# Patient Record
Sex: MALE | Race: WHITE | NOT HISPANIC OR LATINO | Employment: OTHER | URBAN - METROPOLITAN AREA
[De-identification: names, ages, dates, MRNs, and addresses within clinical notes are randomized per-mention and may not be internally consistent; named-entity substitution may affect disease eponyms.]

---

## 2021-02-17 ENCOUNTER — TREATMENT (OUTPATIENT)
Dept: PHYSICAL THERAPY | Facility: CLINIC | Age: 61
End: 2021-02-17

## 2021-02-17 DIAGNOSIS — S76.112D RUPTURE OF QUADRICEPS TENDON, LEFT, SUBSEQUENT ENCOUNTER: Primary | ICD-10-CM

## 2021-02-17 DIAGNOSIS — R26.2 WALKING DIFFICULTY DUE TO KNEE JOINT DISORDER: ICD-10-CM

## 2021-02-17 DIAGNOSIS — M25.562 ACUTE PAIN OF LEFT KNEE: ICD-10-CM

## 2021-02-17 DIAGNOSIS — M25.9 WALKING DIFFICULTY DUE TO KNEE JOINT DISORDER: ICD-10-CM

## 2021-02-17 PROCEDURE — 97110 THERAPEUTIC EXERCISES: CPT | Performed by: PHYSICAL THERAPIST

## 2021-02-17 PROCEDURE — 97530 THERAPEUTIC ACTIVITIES: CPT | Performed by: PHYSICAL THERAPIST

## 2021-02-17 PROCEDURE — 97162 PT EVAL MOD COMPLEX 30 MIN: CPT | Performed by: PHYSICAL THERAPIST

## 2021-02-17 NOTE — PROGRESS NOTES
Physical Therapy Initial Evaluation and Plan of Care    Patient: Abrahan Quinn   : 1960  Diagnosis/ICD-10 Code:  No primary diagnosis found.  Referring practitioner: Cristobal Jones MD  PMx Reviewed : 2021    Subjective Evaluation    History of Present Illness  Date of onset: 2021  Date of surgery: 2/10/2021  Mechanism of injury: Pt slipped on black ice in a parking lot, landing on a flexed (L) knee.  Unable to extend the (L) knee.  Went to the MD the next day.      Had surgery on 2/10/2021, by Cristobal Jones MD.   (L) Quad rupture with repair.      Pt presents wearing a locked brace, using crutches.  Reports using a RWX at home.      Pt was pleasant and accompanied by his wife to visit, but pt presented w/ no cognitive deficit.       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          Quality of life: excellent    Pain  Current pain ratin  At best pain ratin  At worst pain ratin  Location: (L) Knee  Quality: burning, dull ache and discomfort  Relieving factors: ice and medications (Advil, )  Aggravating factors: sleeping (transfers, WB >10 mins)    Social Support  Lives in: multiple-level home  Lives with: spouse (Dog & Cat)       LEFS Score: 12/80 (15% perceived disability)       Objective          Observations   Left Knee   Positive for edema and incision (Pt changed bandage prior to visit but showed pic of incision - No sign of drainage or infection, with staples still intact).     Additional Knee Observation Details  Pt had water proof bandage over incision given to him by the MD office.     Pt wearing a post-op locking knee brace on (L) LE.    Passive Range of Motion     Additional Passive Range of Motion Details  Following Protocol Phase 1 - with brace blocked at 30 degrees flexion, pt was able to perform heel slides to 30 degrees flexion      Strength/Myotome Testing     Left Knee   Quadriceps  contraction: poor    Right Knee   Quadriceps contraction: good    Ambulation   Weight-Bearing Status   Weight-Bearing Status (Left): toe-touch weight-bearing   Assistive device used: crutches and front-wheeled walker    Additional Weight-Bearing Status Details  RWX at home          Assessment & Plan     Assessment  Impairments: abnormal gait, abnormal muscle firing, abnormal or restricted ROM, activity intolerance, impaired balance, impaired physical strength, lacks appropriate home exercise program, pain with function, safety issue and weight-bearing intolerance  Assessment details: Pt presents to PT with symptoms consistent with (L) Quad Rupture repair.  Pt would benefit from skilled PT intervention to address the deficits noted.  Objective evaluation was limited secondary to safety of the repair.    Reviewed with the pt about the pathology, common surgical repair and POC w/ PT for recovery using the protocol given by Dr. Cristobal Jones.  Prognosis: good  Prognosis details:       Functional Limitations: carrying objects, walking, uncomfortable because of pain, moving in bed, sitting, standing, stooping and unable to perform repetitive tasks  Goals  Plan Goals: SHORT TERM GOALS: Time for Goal Achievement: 15 visits    1.  Patient to be compliant with HEP.   2.  Pt able to ascend/descend steps and transfer with less knee pain < 5/10  3.  Increased hip joint mobility to allow for decreased stress at tibiofemoral joint  4.  Pt. to exhibit increased LE endurance/strength to 3+/5 to allow for increased ease with sit-stand and standing/walking > 30 minutes, such as walking in grocery stores w/ or w/o assistive device.    LONG TERM GOALS: Time for Goal Achievement: 30 visits  1.  Pt to score < 30% perceived disability on LEFS  2.  Patient able to ascend/descend steps and prolonged standing & cooking with pain < 2/10  3.  Pt to exhibit full knee AROM to allow for kneeling, bending squatting as is necessary for ADL's, IADL's  and household activities.   4.  Pt to exhibit LE endurance/strength to 4+/5 to allow for walking, steps and transfers to occur safely for > 30 mins.  5.  Pt to demonstrate increased stability of the knee to balance on foam as needed to traverse uneven terrain to allow for walking in neighborhood.          Plan  Therapy options: will be seen for skilled physical therapy services  Planned modality interventions: ultrasound, electrical stimulation/Russian stimulation, thermotherapy (hydrocollator packs), cryotherapy, TENS and dry needling  Other planned modality interventions: Dry Needling  Planned therapy interventions: balance/weight-bearing training, body mechanics training, functional ROM exercises, flexibility, home exercise program, joint mobilization, stretching, strengthening, soft tissue mobilization, neuromuscular re-education, manual therapy, therapeutic activities, transfer training, gait training, dressing changes, compression, abdominal trunk stabilization and motor coordination training  Frequency: 1-2x week for 30 visits.  Treatment plan discussed with: patient and family        Manual Therapy:          mins  44267;  Therapeutic Exercise:     10     mins  77718;     Neuromuscular Luis Felipe:         mins  20650;    Therapeutic Activity:      15     mins  42291;     Gait Training:            mins  96463;     Ultrasound:           mins  35666;    Electrical Stimulation:          mins  80309 ( );  Dry Needling           mins self-pay  Traction           mins 14133  Canalith Repositioning         mins 07018      Timed Treatment:   25   mins   Total Treatment:     65   mins    PT SIGNATURE: Ruben Porter PT   [place] Lic #[id number]    DATE TREATMENT INITIATED: 2/17/2021    Initial Certification   Certification Period: 5/18/2021  I certify that the therapy services are furnished while this patient is under my care.  The services outlined above are required by this patient, and will be reviewed every 90  days.     PHYSICIAN: Cristobal Jones MD      DATE:     Please sign and return via fax to [phone]. Thank you, [facility].

## 2021-02-23 ENCOUNTER — TREATMENT (OUTPATIENT)
Dept: PHYSICAL THERAPY | Facility: CLINIC | Age: 61
End: 2021-02-23

## 2021-02-23 DIAGNOSIS — R26.2 WALKING DIFFICULTY DUE TO KNEE JOINT DISORDER: ICD-10-CM

## 2021-02-23 DIAGNOSIS — M25.9 WALKING DIFFICULTY DUE TO KNEE JOINT DISORDER: ICD-10-CM

## 2021-02-23 DIAGNOSIS — S76.112D RUPTURE OF QUADRICEPS TENDON, LEFT, SUBSEQUENT ENCOUNTER: Primary | ICD-10-CM

## 2021-02-23 DIAGNOSIS — M25.562 ACUTE PAIN OF LEFT KNEE: ICD-10-CM

## 2021-02-23 PROCEDURE — 97110 THERAPEUTIC EXERCISES: CPT | Performed by: PHYSICAL THERAPIST

## 2021-02-23 PROCEDURE — 97140 MANUAL THERAPY 1/> REGIONS: CPT | Performed by: PHYSICAL THERAPIST

## 2021-02-24 NOTE — PROGRESS NOTES
Physical Therapy Daily Progress Note  Visit # 2           Patient: Abrahan Quinn   : 1960  Diagnosis/ICD-10 Code:  Rupture of quadriceps tendon, left, subsequent encounter [S76.112D]  Referring practitioner: Cristobal Jones MD  Date of Initial Evaluation:  Type: THERAPY  Noted: 2021      Subjective  Abrahan Quinn reports:   Doing well overall, no specific areas of pain noted at onset of today's visit.  Feels like he is able to walk normally for short distances without Cx.         Objective   See Exercise, Manual, and Modality Logs for complete treatment.     Reviewed current HEP, progressed therex program with exercises as noted.  Added supine hip abd, patellar mobs, clamshell to HEP, written instructions issued ([employer] code [id number]).      Assessment & Plan     Assessment  Assessment details:   Tolerated continued manual therapy and progression of therapeutic exercise well today, no increased pain reported during or after exercises.    Reviewed MD protocol w/ pt, discussed keeping brace locked during ambulation and while sleeping, unlocking to 50 degrees (45 degrees per protocol) with sitting and with exercise at home.  Reviewed TTWB with pt and advised to continue with TTWB, not to push WB status.           Progress per Plan of Care and Progress strengthening /stabilization /functional activity           Timed:         Manual Therapy:     9    mins  80821     Therapeutic Exercise:     32    mins  27240     Neuromuscular Luis Felipe:        mins  60997    Therapeutic Activity:          mins  59505     Gait Training:           mins  46360     Ultrasound:          mins  56444    Ionto                                   mins  76131  Self Care                            mins  56119    Un-Timed:  Electrical Stimulation:         mins 12693 ( )  Traction          mins 40559    Timed Treatment:   41   mins   Total Treatment:     45   mins    DANITA Garrett License #[id number]  Physical Therapist  Assistant

## 2021-02-26 ENCOUNTER — TREATMENT (OUTPATIENT)
Dept: PHYSICAL THERAPY | Facility: CLINIC | Age: 61
End: 2021-02-26

## 2021-02-26 DIAGNOSIS — R26.2 WALKING DIFFICULTY DUE TO KNEE JOINT DISORDER: ICD-10-CM

## 2021-02-26 DIAGNOSIS — S76.112D RUPTURE OF QUADRICEPS TENDON, LEFT, SUBSEQUENT ENCOUNTER: Primary | ICD-10-CM

## 2021-02-26 DIAGNOSIS — M25.9 WALKING DIFFICULTY DUE TO KNEE JOINT DISORDER: ICD-10-CM

## 2021-02-26 DIAGNOSIS — M25.562 ACUTE PAIN OF LEFT KNEE: ICD-10-CM

## 2021-02-26 PROCEDURE — 97110 THERAPEUTIC EXERCISES: CPT | Performed by: PHYSICAL THERAPIST

## 2021-02-26 PROCEDURE — 97530 THERAPEUTIC ACTIVITIES: CPT | Performed by: PHYSICAL THERAPIST

## 2021-03-02 ENCOUNTER — TREATMENT (OUTPATIENT)
Dept: PHYSICAL THERAPY | Facility: CLINIC | Age: 61
End: 2021-03-02

## 2021-03-02 DIAGNOSIS — M25.9 WALKING DIFFICULTY DUE TO KNEE JOINT DISORDER: ICD-10-CM

## 2021-03-02 DIAGNOSIS — R26.2 WALKING DIFFICULTY DUE TO KNEE JOINT DISORDER: ICD-10-CM

## 2021-03-02 DIAGNOSIS — S76.112D RUPTURE OF QUADRICEPS TENDON, LEFT, SUBSEQUENT ENCOUNTER: Primary | ICD-10-CM

## 2021-03-02 DIAGNOSIS — M25.562 ACUTE PAIN OF LEFT KNEE: ICD-10-CM

## 2021-03-02 PROCEDURE — 97110 THERAPEUTIC EXERCISES: CPT | Performed by: PHYSICAL THERAPIST

## 2021-03-02 PROCEDURE — 97112 NEUROMUSCULAR REEDUCATION: CPT | Performed by: PHYSICAL THERAPIST

## 2021-03-02 NOTE — PROGRESS NOTES
Physical Therapy Daily Progress Note  Visit # 4           Patient: Abrahan Quinn   : 1960  Diagnosis/ICD-10 Code:  Rupture of quadriceps tendon, left, subsequent encounter [S76.112D]  Referring practitioner: Cristobal Jones MD  Date of Initial Evaluation:  Type: THERAPY  Noted: 2021        Subjective  Abrahan Quinn reports:   Doing well overall, no specific areas of pain noted at onset of today's visit.  No c/o or questions with HEP.          Objective   See Exercise, Manual, and Modality Logs for complete treatment.     Reviewed current HEP.  Progressed PT Rx with Russian ES to (L) quads, 10s/30s cycle x10 min, w/quad set during, monitored throughout.          Assessment/Plan  Tolerated continued manual therapy and therapeutic exercise well today, no increased pain reported during or after exercises.  Effective quad contraction achieved with Slovak ES.         Progress per Plan of Care and Progress strengthening /stabilization /functional activity           Timed:         Manual Therapy:     5    mins  82060     Therapeutic Exercise:     30    mins  68279     Neuromuscular Luis Felipe:    10    mins  63977    Therapeutic Activity:          mins  89313     Gait Training:           mins  17428     Ultrasound:          mins  55157    Ionto                                   mins  90833  Self Care                            mins  67037    Un-Timed:  Electrical Stimulation:         mins 06320 (MC )  Traction          mins 21474    Timed Treatment:   45   mins   Total Treatment:     45   mins    DANITA Garrett License #[id number]  Physical Therapist Assistant

## 2021-03-05 ENCOUNTER — TREATMENT (OUTPATIENT)
Dept: PHYSICAL THERAPY | Facility: CLINIC | Age: 61
End: 2021-03-05

## 2021-03-05 DIAGNOSIS — S76.112D RUPTURE OF QUADRICEPS TENDON, LEFT, SUBSEQUENT ENCOUNTER: Primary | ICD-10-CM

## 2021-03-05 DIAGNOSIS — M25.562 ACUTE PAIN OF LEFT KNEE: ICD-10-CM

## 2021-03-05 DIAGNOSIS — R26.2 WALKING DIFFICULTY DUE TO KNEE JOINT DISORDER: ICD-10-CM

## 2021-03-05 DIAGNOSIS — M25.9 WALKING DIFFICULTY DUE TO KNEE JOINT DISORDER: ICD-10-CM

## 2021-03-05 PROCEDURE — 97110 THERAPEUTIC EXERCISES: CPT | Performed by: PHYSICAL THERAPIST

## 2021-03-05 PROCEDURE — 97112 NEUROMUSCULAR REEDUCATION: CPT | Performed by: PHYSICAL THERAPIST

## 2021-03-05 NOTE — PROGRESS NOTES
Physical Therapy Daily Progress Note  Visit # 5           Patient: Abrahan Quinn   : 1960  Diagnosis/ICD-10 Code:  Rupture of quadriceps tendon, left, subsequent encounter [S76.112D]  Referring practitioner: Cristobal Jones MD  Date of Initial Evaluation:  Type: THERAPY  Noted: 2021        Subjective  Abrahan Quinn reports:   Doing well overall, no specific areas of pain noted at onset of today's visit.  No c/o or questions with HEP.          Objective   See Exercise, Manual, and Modality Logs for complete treatment.     Reviewed current HEP.  Continued with NMES to (L) quads, 10s/30s cycle x12 min, w/quad set during, monitored throughout.          Assessment/Plan  Tolerated continued manual therapy and therapeutic exercise well today, no increased pain reported during or after exercises.  Effective quad contraction achieved with NMES.  Discussed acquiring NMES unit for home use and began process with [employer] rep.           Progress per Plan of Care and Progress strengthening /stabilization /functional activity           Timed:         Manual Therapy:     5    mins  57577     Therapeutic Exercise:     30    mins  97962     Neuromuscular Luis Felipe:    10    mins  90799    Therapeutic Activity:          mins  52993     Gait Training:           mins  70550     Ultrasound:          mins  24219    Ionto                                   mins  64020  Self Care                            mins  99045    Un-Timed:  Electrical Stimulation:         mins 91284 ( )  Traction          mins 06896    Timed Treatment:   45   mins   Total Treatment:     50   mins    DANITA Garrett License #[id number]  Physical Therapist Assistant

## 2021-03-09 ENCOUNTER — TREATMENT (OUTPATIENT)
Dept: PHYSICAL THERAPY | Facility: CLINIC | Age: 61
End: 2021-03-09

## 2021-03-09 DIAGNOSIS — M25.562 ACUTE PAIN OF LEFT KNEE: ICD-10-CM

## 2021-03-09 DIAGNOSIS — R26.2 WALKING DIFFICULTY DUE TO KNEE JOINT DISORDER: ICD-10-CM

## 2021-03-09 DIAGNOSIS — M25.9 WALKING DIFFICULTY DUE TO KNEE JOINT DISORDER: ICD-10-CM

## 2021-03-09 DIAGNOSIS — S76.112D RUPTURE OF QUADRICEPS TENDON, LEFT, SUBSEQUENT ENCOUNTER: Primary | ICD-10-CM

## 2021-03-09 PROCEDURE — 97112 NEUROMUSCULAR REEDUCATION: CPT | Performed by: PHYSICAL THERAPIST

## 2021-03-09 PROCEDURE — 97110 THERAPEUTIC EXERCISES: CPT | Performed by: PHYSICAL THERAPIST

## 2021-03-09 NOTE — PROGRESS NOTES
Physical Therapy Daily Progress Note  Visit: 6    Abrahan Cheyenne reports: I am doing good.  I have been doing more on my leg with my brace on.     Subjective     Objective   See Exercise, Manual, and Modality Logs for complete treatment.       Assessment & Plan     Assessment  Assessment details: The pt demos improving maddie for exercise.  THe pt present to PT today with only 1 axillary crutch, but appears to be safe.  Progress per protocol.      Plan  Plan details: Progress ROM / strengthening / stabilization / functional activity as tolerated          Manual Therapy:          mins  32860;  Therapeutic Exercise:     30     mins  77230;     Neuromuscular Luis Felipe:     18    mins  77577;    Therapeutic Activity:           mins  58674;     Gait Training:            mins  84796;     Ultrasound:           mins  46108;    Electrical Stimulation:          mins  79122 ( );  Dry Needling           mins self-pay  Traction           mins 82046  Canalith Repositioning         mins 85988      Timed Treatment:   48   mins   Total Treatment:     48   mins    Ruben Porter PT  [place] License #: [id number]    Physical Therapist

## 2021-03-12 ENCOUNTER — TREATMENT (OUTPATIENT)
Dept: PHYSICAL THERAPY | Facility: CLINIC | Age: 61
End: 2021-03-12

## 2021-03-12 DIAGNOSIS — R26.2 WALKING DIFFICULTY DUE TO KNEE JOINT DISORDER: ICD-10-CM

## 2021-03-12 DIAGNOSIS — M25.562 ACUTE PAIN OF LEFT KNEE: ICD-10-CM

## 2021-03-12 DIAGNOSIS — S76.112D RUPTURE OF QUADRICEPS TENDON, LEFT, SUBSEQUENT ENCOUNTER: Primary | ICD-10-CM

## 2021-03-12 DIAGNOSIS — M25.9 WALKING DIFFICULTY DUE TO KNEE JOINT DISORDER: ICD-10-CM

## 2021-03-12 PROCEDURE — 97110 THERAPEUTIC EXERCISES: CPT | Performed by: PHYSICAL THERAPIST

## 2021-03-12 PROCEDURE — 97112 NEUROMUSCULAR REEDUCATION: CPT | Performed by: PHYSICAL THERAPIST

## 2021-03-12 NOTE — PROGRESS NOTES
Physical Therapy Daily Progress Note  Visit # 7           Patient: Abrahan Quinn   : 1960  Diagnosis/ICD-10 Code:  Rupture of quadriceps tendon, left, subsequent encounter [S76.112D]  Referring practitioner: Cristobal Jones MD  Date of Initial Evaluation:  Type: THERAPY  Noted: 2021        Subjective    Abrahan Quinn reports:   Doing well overall, no specific areas of pain noted at onset of today's visit.  No c/o or questions with HEP.  Has gotten NMES unit and used Using           Objective     See Exercise, Manual, and Modality Logs for complete treatment.     Initiated mini-squat (0-45 deg), weight shifts per week 4 MD protocol.      Continued with NMES to (L) quads, 10s/30s cycle x12 min, w/quad set during, monitored throughout.        (L) knee AROM flexion (post-exercise): 86 degrees      Assessment & Plan     Assessment  Assessment details: Continuing to progress steadily per MD protocol.  Demos understanding of use of NMES, applied in clinic again today for demo of 4-electrode setup.   Met STG #1, progressing toward remaining STG per MD protocol.     Goals  Plan Goals: SHORT TERM GOALS: Time for Goal Achievement: 15 visits    1.  Patient to be compliant with HEP - MET   2.  Pt able to ascend/descend steps and transfer with less knee pain < 5/10  3.  Increased hip joint mobility to allow for decreased stress at tibiofemoral joint  4.  Pt. to exhibit increased LE endurance/strength to 3+/5 to allow for increased ease with sit-stand and standing/walking > 30 minutes, such as walking in grocery stores w/ or w/o assistive device.            Progress per Plan of Care and Progress strengthening /stabilization /functional activity           Timed:         Manual Therapy:         mins  69746     Therapeutic Exercise:     30    mins  76512     Neuromuscular Luis Felipe:    18    mins  29810 (including monitored used of NMES)    Therapeutic Activity:          mins  27397     Gait Training:           mins   51530     Ultrasound:          mins  90735    Ionto                                   mins  02094  Self Care                            mins  83811    Un-Timed:  Electrical Stimulation:         mins 47557 ( )  Traction          mins 74568    Timed Treatment:   48   mins   Total Treatment:     55   mins    DANITA Garrett License #[id number]  Physical Therapist Assistant

## 2021-03-16 ENCOUNTER — TREATMENT (OUTPATIENT)
Dept: PHYSICAL THERAPY | Facility: CLINIC | Age: 61
End: 2021-03-16

## 2021-03-16 DIAGNOSIS — R26.2 WALKING DIFFICULTY DUE TO KNEE JOINT DISORDER: ICD-10-CM

## 2021-03-16 DIAGNOSIS — S76.112D RUPTURE OF QUADRICEPS TENDON, LEFT, SUBSEQUENT ENCOUNTER: Primary | ICD-10-CM

## 2021-03-16 DIAGNOSIS — M25.562 ACUTE PAIN OF LEFT KNEE: ICD-10-CM

## 2021-03-16 DIAGNOSIS — M25.9 WALKING DIFFICULTY DUE TO KNEE JOINT DISORDER: ICD-10-CM

## 2021-03-16 PROCEDURE — 97110 THERAPEUTIC EXERCISES: CPT | Performed by: PHYSICAL THERAPIST

## 2021-03-16 PROCEDURE — 97530 THERAPEUTIC ACTIVITIES: CPT | Performed by: PHYSICAL THERAPIST

## 2021-03-16 PROCEDURE — 97112 NEUROMUSCULAR REEDUCATION: CPT | Performed by: PHYSICAL THERAPIST

## 2021-03-16 NOTE — PROGRESS NOTES
Physical Therapy Daily Progress Note  Visit: 8    Abrahan FormanCheyenne reports: My (L) knee is doing better.  I am using only the brace to walk, but in public I will still take my crutch.  Been using the NME at home 2x day.      Subjective     Objective   See Exercise, Manual, and Modality Logs for complete treatment.       Assessment & Plan     Assessment  Assessment details: The pt continues to progress well with the (L) knee.  Starting to show improved quad contraction and leg control.  Reviewed safety with the (L) knee, safe to progress but not over do it and risk injury.      Plan  Plan details: Progress ROM / strengthening / stabilization / functional activity as tolerated          Manual Therapy:          mins  48039;  Therapeutic Exercise:     35     mins  70113;     Neuromuscular Luis Felipe:     10    mins  76031;    Therapeutic Activity:      10     mins  25672;     Gait Training:            mins  10488;     Ultrasound:           mins  69387;    Electrical Stimulation:          mins  60209 ( );  Dry Needling           mins self-pay  Traction           mins 14902  Canalith Repositioning         mins 75732      Timed Treatment:   55   mins   Total Treatment:     55   mins    Ruben Porter PT  [place] License #: [id number]    Physical Therapist

## 2021-03-19 ENCOUNTER — TREATMENT (OUTPATIENT)
Dept: PHYSICAL THERAPY | Facility: CLINIC | Age: 61
End: 2021-03-19

## 2021-03-19 DIAGNOSIS — S76.112D RUPTURE OF QUADRICEPS TENDON, LEFT, SUBSEQUENT ENCOUNTER: Primary | ICD-10-CM

## 2021-03-19 DIAGNOSIS — M25.9 WALKING DIFFICULTY DUE TO KNEE JOINT DISORDER: ICD-10-CM

## 2021-03-19 DIAGNOSIS — M25.562 ACUTE PAIN OF LEFT KNEE: ICD-10-CM

## 2021-03-19 DIAGNOSIS — R26.2 WALKING DIFFICULTY DUE TO KNEE JOINT DISORDER: ICD-10-CM

## 2021-03-19 PROCEDURE — 97530 THERAPEUTIC ACTIVITIES: CPT | Performed by: PHYSICAL THERAPIST

## 2021-03-19 PROCEDURE — 97112 NEUROMUSCULAR REEDUCATION: CPT | Performed by: PHYSICAL THERAPIST

## 2021-03-19 PROCEDURE — 97110 THERAPEUTIC EXERCISES: CPT | Performed by: PHYSICAL THERAPIST

## 2021-03-19 NOTE — PROGRESS NOTES
Re-Assessment / Progress Note    Patient: Abrahan Quinn   : 1960  Diagnosis/ICD-10 Code:  Rupture of quadriceps tendon, left, subsequent encounter [S76.112D]  Referring practitioner: Cristobal Jones MD  Date of Initial Visit: Episode Type: THERAPY  Noted: 2021    Today's Date: 3/19/2021  Patient seen for 9 sessions.      Subjective:   Abrahan Quinn reports: My (L) knee is doing well.  I am able to do a SLR now w/o the brace.      Subjective Questionnaire: LEFS:  40% perceived normal ability  ( upon eval)  Clinical Progress: improved  Home Program Compliance: Yes  Treatment has included: therapeutic exercise, neuromuscular re-education, manual therapy, therapeutic activity, electrical stimulation and cryotherapy    Subjective Evaluation    Pain  At worst pain ratin  Location: (L) Knee  Exacerbated by: Too much exercise.         Objective          Active Range of Motion   Left Knee   Flexion: 100 degrees   Extension: 0 degrees       Assessment & Plan     Assessment  Assessment details: Abrahan has progressed well with his (L) knee.  We are following close to the give protocol.  He continues to have limited knee flexion, (L) LE weakness and poor (L) LE endurance.  He has started to walk with the knee brace unlocked safely.  The pt would continue to benefit from skilled care at this time to help restore the pt to their prior level of function.  Thank you for this referral.      Plan  Plan details: Progress ROM / strengthening / stabilization / functional activity as tolerated        Progress toward previous goals: Partially Met    Goals  Plan Goals: SHORT TERM GOALS: Time for Goal Achievement: 15 visits    1.  Patient to be compliant with HEP. - MET  2.  Pt able to ascend/descend steps and transfer with less knee pain < 5/10. - PROGRESSING  3.  Increased hip joint mobility to allow for decreased stress at tibiofemoral joint. - PROGRESSING  4.  Pt. to exhibit increased LE endurance/strength to  3+/5 to allow for increased ease with sit-stand and standing/walking > 30 minutes, such as walking in grocery stores w/ or w/o assistive device. - PROGRESSING    LONG TERM GOALS: Time for Goal Achievement: 30 visits  1.  Pt to score < 30% perceived disability on LEFS  2.  Patient able to ascend/descend steps and prolonged standing & cooking with pain < 2/10  3.  Pt to exhibit full knee AROM to allow for kneeling, bending squatting as is necessary for ADL's, IADL's and household activities.   4.  Pt to exhibit LE endurance/strength to 4+/5 to allow for walking, steps and transfers to occur safely for > 30 mins.  5.  Pt to demonstrate increased stability of the knee to balance on foam as needed to traverse uneven terrain to allow for walking in neighborhood.         Recommendations: Continue as planned  Timeframe: 2 months  Prognosis to achieve goals: good    PT Signature: Ruben Porter, PT  [place] Lic. # [id number]      Based upon review of the patient's progress and continued therapy plan, it is my medical opinion that Abrahan Quinn should continue physical therapy treatment at [facility]  [address] [facility], [facility] [address]  [place] [address] [phone] ; Fax Number [phone]    Signature: __________________________________  Cristobal Jones MD    Manual Therapy:          mins  12549;  Therapeutic Exercise:     35     mins  38221;     Neuromuscular Luis Felipe:     10    mins  91527;    Therapeutic Activity:      15     mins  42432;     Gait Training:            mins  63553;     Ultrasound:           mins  48748;    Electrical Stimulation:          mins  35519 ( );  Dry Needling           mins self-pay  Traction           mins 95254  Canalith Repositioning         mins 33053      Timed Treatment:   60   mins   Total Treatment:     60   mins

## 2021-03-23 ENCOUNTER — TREATMENT (OUTPATIENT)
Dept: PHYSICAL THERAPY | Facility: CLINIC | Age: 61
End: 2021-03-23

## 2021-03-23 DIAGNOSIS — R26.2 WALKING DIFFICULTY DUE TO KNEE JOINT DISORDER: ICD-10-CM

## 2021-03-23 DIAGNOSIS — S76.112D RUPTURE OF QUADRICEPS TENDON, LEFT, SUBSEQUENT ENCOUNTER: Primary | ICD-10-CM

## 2021-03-23 DIAGNOSIS — M25.9 WALKING DIFFICULTY DUE TO KNEE JOINT DISORDER: ICD-10-CM

## 2021-03-23 DIAGNOSIS — M25.562 ACUTE PAIN OF LEFT KNEE: ICD-10-CM

## 2021-03-23 PROCEDURE — 97112 NEUROMUSCULAR REEDUCATION: CPT | Performed by: PHYSICAL THERAPIST

## 2021-03-23 PROCEDURE — 97530 THERAPEUTIC ACTIVITIES: CPT | Performed by: PHYSICAL THERAPIST

## 2021-03-23 PROCEDURE — 97110 THERAPEUTIC EXERCISES: CPT | Performed by: PHYSICAL THERAPIST

## 2021-03-23 NOTE — PROGRESS NOTES
Physical Therapy Daily Progress Note  Visit: 10    Abrahan FormanCheyenne reports: My (L) knee is doing better.  I have been walking with the brace unlocked without issue.      Subjective     Objective   See Exercise, Manual, and Modality Logs for complete treatment.       Assessment & Plan     Assessment  Assessment details: The pt continues to progress safely with the (L) knee.  Reviewed with the pt about not over extending himself w/ activity on his (L) LE.  Able to progress strengthening exercise today w/ isotonic exercises.      Starting wk 7 of protocol.    Plan  Plan details: Progress ROM / strengthening / stabilization / functional activity as tolerated          Manual Therapy:          mins  94502;  Therapeutic Exercise:     35     mins  75313;     Neuromuscular Luis Felipe:     10    mins  17728;    Therapeutic Activity:      10     mins  25354;     Gait Training:            mins  81961;     Ultrasound:           mins  72634;    Electrical Stimulation:          mins  91551 ( );  Dry Needling           mins self-pay  Traction           mins 23910  Canalith Repositioning         mins 20496      Timed Treatment:   55   mins   Total Treatment:     55   mins    Ruben Porter PT  [place] License #: [id number]    Physical Therapist

## 2021-03-26 ENCOUNTER — TREATMENT (OUTPATIENT)
Dept: PHYSICAL THERAPY | Facility: CLINIC | Age: 61
End: 2021-03-26

## 2021-03-26 DIAGNOSIS — S76.112D RUPTURE OF QUADRICEPS TENDON, LEFT, SUBSEQUENT ENCOUNTER: Primary | ICD-10-CM

## 2021-03-26 DIAGNOSIS — M25.9 WALKING DIFFICULTY DUE TO KNEE JOINT DISORDER: ICD-10-CM

## 2021-03-26 DIAGNOSIS — R26.2 WALKING DIFFICULTY DUE TO KNEE JOINT DISORDER: ICD-10-CM

## 2021-03-26 DIAGNOSIS — M25.562 ACUTE PAIN OF LEFT KNEE: ICD-10-CM

## 2021-03-26 PROCEDURE — 97530 THERAPEUTIC ACTIVITIES: CPT | Performed by: PHYSICAL THERAPIST

## 2021-03-26 PROCEDURE — 97112 NEUROMUSCULAR REEDUCATION: CPT | Performed by: PHYSICAL THERAPIST

## 2021-03-26 PROCEDURE — 97110 THERAPEUTIC EXERCISES: CPT | Performed by: PHYSICAL THERAPIST

## 2021-03-26 NOTE — PROGRESS NOTES
MD note      Patient: Abrahan Quinn   : 1960  Diagnosis/ICD-10 Code:  Rupture of quadriceps tendon, left, subsequent encounter [S76.112D]  Referring practitioner: Cristobal Jones MD  Date of Initial Visit: Type: THERAPY  Noted: 2021  Today's Date: 3/26/2021  Patient seen for 11 sessions      Subjective:   Clinical Progress: improved  Home Program Compliance: Yes  Treatment has included: therapeutic exercise, neuromuscular re-education, manual therapy, therapeutic activity, electrical stimulation and cryotherapy    Subjective Evaluation    History of Present Illness  Mechanism of injury: My knee is still swollen so I continue to use ice.  It's getting better; slow and steady.  Most days my pain is minimal at 1-2/10.  The only real discomfort is when I sit in a firm chair rated 4-5/10.  I drove myself to PT today for the first time on my own.  It is more of a challenge getting in and out of the 's seat.          Objective          Passive Range of Motion   Left Knee   Flexion: 90 degrees   Extension: 0 degrees     Strength/Myotome Testing     Left Knee   Quadriceps contraction: fair    Swelling     Left Knee Girth Measurement (cm)   Joint line: 38.5.    Right Knee Girth Measurement (cm)   Joint line: 41.0.    Ambulation   Weight-Bearing Status   Assistive device used: none    Additional Weight-Bearing Status Details  Wearing IROM brace unlocked with ace wrap    Observational Gait   Gait: antalgic     Quality of Movement During Gait     Knee    Knee (Left): Positive stiff knee (during swing phase).       Assessment/Plan   Pt is 6 1/2 s/p quad tendon repair.  He easily meets ROM restrictions per protocol and has been compliant with post op precautions/use of brace.  Jt effusion persists and quad strength is improving as pt is able to perform a SLR without extensor lag. He is able to ambulate with his brace unlocked without functional limitation.      To MD    PT Signature: Asuncion Escamilla, PT  [place]  License # [id number]    Based upon review of the patient's progress and continued therapy plan, it is my medical opinion that Abrahan Quinn should continue physical therapy treatment at [facility]  [address] [facility], [address] [address] [phone].    Signature: __________________________________  Referring, Self    Timed:  Manual Therapy:    -     mins  72315;  Therapeutic Exercise:    28     mins  18526;     Neuromuscular Luis Felipe:    10    mins  06283;    Therapeutic Activity:     10     mins  44696;     Gait Training:      -     mins  17264;     Ultrasound:     -     mins  67586;    Iontophoresis                 -     mins 35292    Timed Treatment:   48   mins   Total Treatment:     48   mins

## 2021-03-30 ENCOUNTER — TREATMENT (OUTPATIENT)
Dept: PHYSICAL THERAPY | Facility: CLINIC | Age: 61
End: 2021-03-30

## 2021-03-30 DIAGNOSIS — R26.2 WALKING DIFFICULTY DUE TO KNEE JOINT DISORDER: ICD-10-CM

## 2021-03-30 DIAGNOSIS — M25.562 ACUTE PAIN OF LEFT KNEE: ICD-10-CM

## 2021-03-30 DIAGNOSIS — M25.9 WALKING DIFFICULTY DUE TO KNEE JOINT DISORDER: ICD-10-CM

## 2021-03-30 DIAGNOSIS — S76.112D RUPTURE OF QUADRICEPS TENDON, LEFT, SUBSEQUENT ENCOUNTER: Primary | ICD-10-CM

## 2021-03-30 PROCEDURE — 97112 NEUROMUSCULAR REEDUCATION: CPT | Performed by: PHYSICAL THERAPIST

## 2021-03-30 PROCEDURE — 97110 THERAPEUTIC EXERCISES: CPT | Performed by: PHYSICAL THERAPIST

## 2021-03-30 PROCEDURE — 97530 THERAPEUTIC ACTIVITIES: CPT | Performed by: PHYSICAL THERAPIST

## 2021-03-30 NOTE — PROGRESS NOTES
Physical Therapy Daily Progress Note  Visit: 12    Abrahan Quinn reports: My (L) knee is doing well.  I feel like I am able to do a little more each day.      Subjective     Objective          Active Range of Motion   Left Knee   Flexion: 109 degrees   Extension: 0 degrees       See Exercise, Manual, and Modality Logs for complete treatment.         Assessment & Plan     Assessment  Assessment details: The pt is on track of the protocol, actually doing better than expected with the knee ROM and strength.      Plan  Plan details: Progress ROM / strengthening / stabilization / functional activity as tolerated          Manual Therapy:          mins  14626;  Therapeutic Exercise:     35     mins  12786;     Neuromuscular Luis Felipe:     10    mins  82999;    Therapeutic Activity:      10     mins  79363;     Gait Training:            mins  35535;     Ultrasound:           mins  07061;    Electrical Stimulation:          mins  10829 ( );  Dry Needling           mins self-pay  Traction           mins 57116  Canalith Repositioning         mins 33686      Timed Treatment:   55   mins   Total Treatment:     55   mins    Ruben Porter PT  [place] License #: [id number]    Physical Therapist

## 2021-04-02 ENCOUNTER — TREATMENT (OUTPATIENT)
Dept: PHYSICAL THERAPY | Facility: CLINIC | Age: 61
End: 2021-04-02

## 2021-04-02 DIAGNOSIS — S76.112D RUPTURE OF QUADRICEPS TENDON, LEFT, SUBSEQUENT ENCOUNTER: Primary | ICD-10-CM

## 2021-04-02 DIAGNOSIS — M25.9 WALKING DIFFICULTY DUE TO KNEE JOINT DISORDER: ICD-10-CM

## 2021-04-02 DIAGNOSIS — M25.562 ACUTE PAIN OF LEFT KNEE: ICD-10-CM

## 2021-04-02 DIAGNOSIS — R26.2 WALKING DIFFICULTY DUE TO KNEE JOINT DISORDER: ICD-10-CM

## 2021-04-02 PROCEDURE — 97110 THERAPEUTIC EXERCISES: CPT | Performed by: PHYSICAL THERAPIST

## 2021-04-02 PROCEDURE — 97530 THERAPEUTIC ACTIVITIES: CPT | Performed by: PHYSICAL THERAPIST

## 2021-04-02 PROCEDURE — 97112 NEUROMUSCULAR REEDUCATION: CPT | Performed by: PHYSICAL THERAPIST

## 2021-04-02 NOTE — PROGRESS NOTES
Physical Therapy Daily Progress Note  Visit: 13    Abrahan FormanCheyenne reports: My (L) knee is doing better.  I am feeling stronger in my (L) leg, but can tell that is still very weak.      Subjective     Objective   See Exercise, Manual, and Modality Logs for complete treatment.       Assessment & Plan     Assessment  Assessment details: The pt continues to improve with increasing exercise tolerance.  The (L) knee conts to demo weakness, especially upon eccentric contractions of the quad.      Plan  Plan details: Progress ROM / strengthening / stabilization / functional activity as tolerated          Manual Therapy:          mins  93325;  Therapeutic Exercise:     35     mins  87212;     Neuromuscular Luis Felipe:     10    mins  54800;    Therapeutic Activity:      10     mins  12576;     Gait Training:            mins  80073;     Ultrasound:           mins  64704;    Electrical Stimulation:          mins  73483 ( );  Dry Needling           mins self-pay  Traction           mins 42115  Canalith Repositioning         mins 74334      Timed Treatment:   55   mins   Total Treatment:     55   mins    Ruben Porter PT  [place] License #: [id number]    Physical Therapist

## 2021-04-06 ENCOUNTER — TREATMENT (OUTPATIENT)
Dept: PHYSICAL THERAPY | Facility: CLINIC | Age: 61
End: 2021-04-06

## 2021-04-06 DIAGNOSIS — R26.2 WALKING DIFFICULTY DUE TO KNEE JOINT DISORDER: ICD-10-CM

## 2021-04-06 DIAGNOSIS — M25.9 WALKING DIFFICULTY DUE TO KNEE JOINT DISORDER: ICD-10-CM

## 2021-04-06 DIAGNOSIS — M25.562 ACUTE PAIN OF LEFT KNEE: ICD-10-CM

## 2021-04-06 DIAGNOSIS — S76.112D RUPTURE OF QUADRICEPS TENDON, LEFT, SUBSEQUENT ENCOUNTER: Primary | ICD-10-CM

## 2021-04-06 PROCEDURE — 97112 NEUROMUSCULAR REEDUCATION: CPT | Performed by: PHYSICAL THERAPIST

## 2021-04-06 PROCEDURE — 97110 THERAPEUTIC EXERCISES: CPT | Performed by: PHYSICAL THERAPIST

## 2021-04-06 PROCEDURE — 97530 THERAPEUTIC ACTIVITIES: CPT | Performed by: PHYSICAL THERAPIST

## 2021-04-06 NOTE — PROGRESS NOTES
Physical Therapy Daily Progress Note  Visit # 14           Patient: Abrahan Quinn   : 1960  Diagnosis/ICD-10 Code:  Rupture of quadriceps tendon, left, subsequent encounter [S76.112D]  Referring practitioner: Self Referring  Date of Initial Evaluation:  Type: THERAPY  Noted: 2021      Subjective  Abrahan Quinn reports:   No significant changes from status at last PT visit through today.  No specific areas of pain noted at onset of today's visit.        Objective   See Exercise, Manual, and Modality Logs for complete treatment.       Assessment & Plan     Assessment  Assessment details:   Tolerated continued therapeutic exercise/therapeutic activity well today, no increased pain reported during or after exercises.             Progress per Plan of Care and Progress strengthening /stabilization /functional activity          Timed:         Manual Therapy:         mins  11720     Therapeutic Exercise:     30    mins  95359     Neuromuscular Luis Felipe:    12    mins  90631    Therapeutic Activity:      12    mins  71563     Gait Training:           mins  94582     Ultrasound:          mins  07603    Ionto                                   mins  97247  Self Care                            mins  25358    Un-Timed:  Electrical Stimulation:         mins 12807 ( )  Traction          mins 13848    Timed Treatment:   54   mins   Total Treatment:     58   mins    DANITA Garrett License #[id number]  Physical Therapist Assistant

## 2021-04-09 ENCOUNTER — TREATMENT (OUTPATIENT)
Dept: PHYSICAL THERAPY | Facility: CLINIC | Age: 61
End: 2021-04-09

## 2021-04-09 DIAGNOSIS — M25.9 WALKING DIFFICULTY DUE TO KNEE JOINT DISORDER: ICD-10-CM

## 2021-04-09 DIAGNOSIS — R26.2 WALKING DIFFICULTY DUE TO KNEE JOINT DISORDER: ICD-10-CM

## 2021-04-09 DIAGNOSIS — M25.562 ACUTE PAIN OF LEFT KNEE: ICD-10-CM

## 2021-04-09 DIAGNOSIS — S76.112D RUPTURE OF QUADRICEPS TENDON, LEFT, SUBSEQUENT ENCOUNTER: Primary | ICD-10-CM

## 2021-04-09 PROCEDURE — 97530 THERAPEUTIC ACTIVITIES: CPT | Performed by: PHYSICAL THERAPIST

## 2021-04-09 PROCEDURE — 97110 THERAPEUTIC EXERCISES: CPT | Performed by: PHYSICAL THERAPIST

## 2021-04-09 PROCEDURE — 97112 NEUROMUSCULAR REEDUCATION: CPT | Performed by: PHYSICAL THERAPIST

## 2021-04-09 NOTE — PROGRESS NOTES
Physical Therapy Daily Progress Note  Visit # 15           Patient: Abrahan Quinn   : 1960  Diagnosis/ICD-10 Code:  Rupture of quadriceps tendon, left, subsequent encounter [S76.112D]  Referring practitioner: Self Referring  Date of Initial Evaluation:  Type: THERAPY  Noted: 2021      Subjective  Abrahan Quinn reports:   No significant changes from status at last PT visit through today.  No specific areas of pain noted at onset of today's visit.        Objective   See Exercise, Manual, and Modality Logs for complete treatment.   Performed leg press, rocker board without brace in controlled manner throughout.  Added partial range lunge at end of session while wearing brace.       Assessment & Plan     Assessment  Assessment details:   Tolerated continued therapeutic exercise/therapeutic activity well today, no increased pain reported during or after exercises.  Progressing steadily per MD protocol.             Progress per Plan of Care and Progress strengthening /stabilization /functional activity          Timed:         Manual Therapy:         mins  37746     Therapeutic Exercise:     30    mins  60463     Neuromuscular Luis Felipe:    14    mins  44217    Therapeutic Activity:      12    mins  82792     Gait Training:           mins  08838     Ultrasound:          mins  69294    Ionto                                   mins  10585  Self Care                            mins  87384    Un-Timed:  Electrical Stimulation:         mins 68443 ( )  Traction          mins 71882    Timed Treatment:   56   mins   Total Treatment:     56   mins    DANITA Garrett License #[id number]  Physical Therapist Assistant

## 2021-04-13 ENCOUNTER — TREATMENT (OUTPATIENT)
Dept: PHYSICAL THERAPY | Facility: CLINIC | Age: 61
End: 2021-04-13

## 2021-04-13 DIAGNOSIS — S76.112D RUPTURE OF QUADRICEPS TENDON, LEFT, SUBSEQUENT ENCOUNTER: Primary | ICD-10-CM

## 2021-04-13 DIAGNOSIS — M25.562 ACUTE PAIN OF LEFT KNEE: ICD-10-CM

## 2021-04-13 DIAGNOSIS — R26.2 WALKING DIFFICULTY DUE TO KNEE JOINT DISORDER: ICD-10-CM

## 2021-04-13 DIAGNOSIS — M25.9 WALKING DIFFICULTY DUE TO KNEE JOINT DISORDER: ICD-10-CM

## 2021-04-13 PROCEDURE — 97530 THERAPEUTIC ACTIVITIES: CPT | Performed by: PHYSICAL THERAPIST

## 2021-04-13 PROCEDURE — 97112 NEUROMUSCULAR REEDUCATION: CPT | Performed by: PHYSICAL THERAPIST

## 2021-04-13 PROCEDURE — 97110 THERAPEUTIC EXERCISES: CPT | Performed by: PHYSICAL THERAPIST

## 2021-04-13 NOTE — PROGRESS NOTES
Physical Therapy Daily Progress Note  Visit: 16    Abrahan Cheyenne reports: I went to the [employer] boat on Sunday and was walking and on my feet for about 3 hours and the next day my (L) hip was hurting and sore.  I also got ankle weights on Friday night and tried 1 lb on Saturday with my exercises and it made me sore.      Subjective     Objective          Active Range of Motion   Left Knee   Flexion: 115 degrees       See Exercise, Manual, and Modality Logs for complete treatment.       Assessment & Plan     Assessment  Assessment details: Limited progression of exercises today to help not injurer the knee after this weekends activities.  I am not concerned for injury, but did review safety protocols of not over doing it.  The pt seems to understand and verbalized his mistake before review.      Plan  Plan details: Progress ROM / strengthening / stabilization / functional activity as tolerated          Manual Therapy:          mins  20544;  Therapeutic Exercise:     35     mins  11816;     Neuromuscular Luis Felipe:     10    mins  24769;    Therapeutic Activity:      10     mins  20838;     Gait Training:            mins  97354;     Ultrasound:           mins  58182;    Electrical Stimulation:          mins  32754 ( );  Dry Needling           mins self-pay  Traction           mins 50543  Canalith Repositioning         mins 48123      Timed Treatment:   55   mins   Total Treatment:     55   mins    Ruben Porter PT  [place] License #: [id number]    Physical Therapist

## 2021-04-16 ENCOUNTER — TREATMENT (OUTPATIENT)
Dept: PHYSICAL THERAPY | Facility: CLINIC | Age: 61
End: 2021-04-16

## 2021-04-16 DIAGNOSIS — M25.9 WALKING DIFFICULTY DUE TO KNEE JOINT DISORDER: ICD-10-CM

## 2021-04-16 DIAGNOSIS — M25.562 ACUTE PAIN OF LEFT KNEE: ICD-10-CM

## 2021-04-16 DIAGNOSIS — R26.2 WALKING DIFFICULTY DUE TO KNEE JOINT DISORDER: ICD-10-CM

## 2021-04-16 DIAGNOSIS — S76.112D RUPTURE OF QUADRICEPS TENDON, LEFT, SUBSEQUENT ENCOUNTER: Primary | ICD-10-CM

## 2021-04-16 PROCEDURE — 97112 NEUROMUSCULAR REEDUCATION: CPT | Performed by: PHYSICAL THERAPIST

## 2021-04-16 PROCEDURE — 97530 THERAPEUTIC ACTIVITIES: CPT | Performed by: PHYSICAL THERAPIST

## 2021-04-16 PROCEDURE — 97110 THERAPEUTIC EXERCISES: CPT | Performed by: PHYSICAL THERAPIST

## 2021-04-16 NOTE — PROGRESS NOTES
Physical Therapy Daily Progress Note  Visit # 17           Patient: Abrahan Quinn   : 1960  Diagnosis/ICD-10 Code:  Rupture of quadriceps tendon, left, subsequent encounter [S76.112D]  Referring practitioner: Self Referring  Date of Initial Evaluation:  Type: THERAPY  Noted: 2021      Subjective  Abrahan Quinn reports:   Doing well overall, no specific areas of pain noted at onset of today's visit.    Pt presents to PT today wearing knee sleeve vs hinged knee brace.  Decided to wear sleeve vs brace, put sleeve on only minutes prior to appointment.       Objective   See Exercise, Manual, and Modality Logs for complete treatment.       Assessment & Plan     Assessment  Assessment details: Tolerated continued progression of therapeutic exercise/therapeutic activity well today, no increased pain reported during or after exercises.  Demos good quad set without lag through 45 total reps of SLR.  Would continue to benefit from skilled PT progressing with functional WB, strength and stability activities.              Progress per Plan of Care and Progress strengthening /stabilization /functional activity           Timed:         Manual Therapy:         mins  83846     Therapeutic Exercise:     30    mins  78658     Neuromuscular Luis Felipe:    10    mins  14007    Therapeutic Activity:      10    mins  69891     Gait Training:           mins  20535     Ultrasound:          mins  21977    Ionto                                   mins  85905  Self Care                            mins  89161    Un-Timed:  Electrical Stimulation:         mins 23731 ( )  Traction          mins 08097    Timed Treatment:   50   mins   Total Treatment:     68   mins    DANITA aGrrett License #[id number]  Physical Therapist Assistant

## 2021-04-20 ENCOUNTER — TREATMENT (OUTPATIENT)
Dept: PHYSICAL THERAPY | Facility: CLINIC | Age: 61
End: 2021-04-20

## 2021-04-20 DIAGNOSIS — R26.2 WALKING DIFFICULTY DUE TO KNEE JOINT DISORDER: ICD-10-CM

## 2021-04-20 DIAGNOSIS — M25.9 WALKING DIFFICULTY DUE TO KNEE JOINT DISORDER: ICD-10-CM

## 2021-04-20 DIAGNOSIS — M25.562 ACUTE PAIN OF LEFT KNEE: ICD-10-CM

## 2021-04-20 DIAGNOSIS — S76.112D RUPTURE OF QUADRICEPS TENDON, LEFT, SUBSEQUENT ENCOUNTER: Primary | ICD-10-CM

## 2021-04-20 PROCEDURE — 97110 THERAPEUTIC EXERCISES: CPT | Performed by: PHYSICAL THERAPIST

## 2021-04-20 PROCEDURE — 97530 THERAPEUTIC ACTIVITIES: CPT | Performed by: PHYSICAL THERAPIST

## 2021-04-20 PROCEDURE — 97112 NEUROMUSCULAR REEDUCATION: CPT | Performed by: PHYSICAL THERAPIST

## 2021-04-20 NOTE — PROGRESS NOTES
Physical Therapy Daily Progress Note  Visit # 18           Patient: Abrahan Quinn   : 1960  Diagnosis/ICD-10 Code:  Rupture of quadriceps tendon, left, subsequent encounter [S76.112D]  Referring practitioner: Self Referring  Date of Initial Evaluation:  Type: THERAPY  Noted: 2021      Subjective  Abrahan Quinn reports:   This past weekend was the first time I actually went out, I had no problems with my knee.     Pt presents to PT today wearing knee sleeve vs hinged knee brace.        Objective   See Exercise, Manual, and Modality Logs for complete treatment.       Assessment & Plan     Assessment  Assessment details: Tolerated continued progression of therapeutic exercise/therapeutic activity well today, no increased pain reported during or after exercises.  Demos good quad set without lag through 45 total reps of SLR.  Would continue to benefit from skilled PT progressing with functional WB, strength and stability activities.          Goals  Plan Goals: SHORT TERM GOALS: Time for Goal Achievement: 15 visits    1.  Patient to be compliant with HEP. - MET  2.  Pt able to ascend/descend steps and transfer with less knee pain < 5/10. - PROGRESSING  3.  Increased hip joint mobility to allow for decreased stress at tibiofemoral joint. - MET  4.  Pt. to exhibit increased LE endurance/strength to 3+/5 to allow for increased ease with sit-stand and standing/walking > 30 minutes, such as walking in grocery stores w/ or w/o assistive device. - PROGRESSING    LONG TERM GOALS: Time for Goal Achievement: 30 visits  1.  Pt to score < 30% perceived disability on LEFS  2.  Patient able to ascend/descend steps and prolonged standing & cooking with pain < 2/10  3.  Pt to exhibit full knee AROM to allow for kneeling, bending squatting as is necessary for ADL's, IADL's and household activities.   4.  Pt to exhibit LE endurance/strength to 4+/5 to allow for walking, steps and transfers to occur safely for > 30  mins.  5.  Pt to demonstrate increased stability of the knee to balance on foam as needed to traverse uneven terrain to allow for walking in neighborhood.           Progress per Plan of Care and Progress strengthening /stabilization /functional activity           Timed:         Manual Therapy:         mins  81672     Therapeutic Exercise:     30    mins  08084     Neuromuscular Luis Felipe:    10    mins  53693    Therapeutic Activity:      10    mins  01887     Gait Training:           mins  43528     Ultrasound:          mins  36374    Ionto                                   mins  71275  Self Care                            mins  39946    Un-Timed:  Electrical Stimulation:         mins 59593 ( )  Traction          mins 89989    Timed Treatment:   50   mins   Total Treatment:     66   mins    DANITA Garrett License #[id number]  Physical Therapist Assistant

## 2021-04-23 ENCOUNTER — TREATMENT (OUTPATIENT)
Dept: PHYSICAL THERAPY | Facility: CLINIC | Age: 61
End: 2021-04-23

## 2021-04-23 DIAGNOSIS — R26.2 WALKING DIFFICULTY DUE TO KNEE JOINT DISORDER: ICD-10-CM

## 2021-04-23 DIAGNOSIS — M25.562 ACUTE PAIN OF LEFT KNEE: ICD-10-CM

## 2021-04-23 DIAGNOSIS — S76.112D RUPTURE OF QUADRICEPS TENDON, LEFT, SUBSEQUENT ENCOUNTER: Primary | ICD-10-CM

## 2021-04-23 DIAGNOSIS — M25.9 WALKING DIFFICULTY DUE TO KNEE JOINT DISORDER: ICD-10-CM

## 2021-04-23 PROCEDURE — 97112 NEUROMUSCULAR REEDUCATION: CPT | Performed by: PHYSICAL THERAPIST

## 2021-04-23 PROCEDURE — 97530 THERAPEUTIC ACTIVITIES: CPT | Performed by: PHYSICAL THERAPIST

## 2021-04-23 PROCEDURE — 97110 THERAPEUTIC EXERCISES: CPT | Performed by: PHYSICAL THERAPIST

## 2021-04-23 NOTE — PROGRESS NOTES
Physical Therapy Daily Progress Note  Visit # 19           Patient: Abrahan Quinn   : 1960  Diagnosis/ICD-10 Code:  Rupture of quadriceps tendon, left, subsequent encounter [S76.112D]  Referring practitioner: Self Referring  Date of Initial Evaluation:  Type: THERAPY  Noted: 2021      Subjective  Abrahan Quinn reports:   Doing well overall, no new c/o today vs last PT visit.        Objective   See Exercise, Manual, and Modality Logs for complete treatment.       Assessment & Plan     Assessment  Assessment details:   Tolerated continued progression of therapeutic exercise/therapeutic activity well today, no increased pain reported during or after exercises.  Demos good quad set without lag through 45 total reps of SLR.    Would continue to benefit from skilled PT progressing with functional WB, strength and stability activities per MD protocol.          Goals  Plan Goals: SHORT TERM GOALS: Time for Goal Achievement: 15 visits    1.  Patient to be compliant with HEP. - MET  2.  Pt able to ascend/descend steps and transfer with less knee pain < 5/10. - PROGRESSING  3.  Increased hip joint mobility to allow for decreased stress at tibiofemoral joint. - MET  4.  Pt. to exhibit increased LE endurance/strength to 3+/5 to allow for increased ease with sit-stand and standing/walking > 30 minutes, such as walking in grocery stores w/ or w/o assistive device. - PROGRESSING    LONG TERM GOALS: Time for Goal Achievement: 30 visits  1.  Pt to score < 30% perceived disability on LEFS  2.  Patient able to ascend/descend steps and prolonged standing & cooking with pain < 2/10  3.  Pt to exhibit full knee AROM to allow for kneeling, bending squatting as is necessary for ADL's, IADL's and household activities.   4.  Pt to exhibit LE endurance/strength to 4+/5 to allow for walking, steps and transfers to occur safely for > 30 mins.  5.  Pt to demonstrate increased stability of the knee to balance on foam as needed  to traverse uneven terrain to allow for walking in neighborhood.           Progress per Plan of Care and Progress strengthening /stabilization /functional activity           Timed:         Manual Therapy:         mins  66989     Therapeutic Exercise:     28    mins  25635     Neuromuscular Luis Felipe:    10    mins  32964    Therapeutic Activity:      15    mins  32705     Gait Training:           mins  55165     Ultrasound:          mins  45088    Ionto                                   mins  67308  Self Care                            mins  13376    Un-Timed:  Electrical Stimulation:         mins 42015 ( )  Traction          mins 73238    Timed Treatment:   53   mins   Total Treatment:     53   mins    DANITA Garrett License #[id number]  Physical Therapist Assistant

## 2021-04-27 ENCOUNTER — TREATMENT (OUTPATIENT)
Dept: PHYSICAL THERAPY | Facility: CLINIC | Age: 61
End: 2021-04-27

## 2021-04-27 DIAGNOSIS — M25.9 WALKING DIFFICULTY DUE TO KNEE JOINT DISORDER: ICD-10-CM

## 2021-04-27 DIAGNOSIS — R26.2 WALKING DIFFICULTY DUE TO KNEE JOINT DISORDER: ICD-10-CM

## 2021-04-27 DIAGNOSIS — S76.112D RUPTURE OF QUADRICEPS TENDON, LEFT, SUBSEQUENT ENCOUNTER: Primary | ICD-10-CM

## 2021-04-27 DIAGNOSIS — M25.562 ACUTE PAIN OF LEFT KNEE: ICD-10-CM

## 2021-04-27 PROCEDURE — 97110 THERAPEUTIC EXERCISES: CPT | Performed by: PHYSICAL THERAPIST

## 2021-04-27 PROCEDURE — 97112 NEUROMUSCULAR REEDUCATION: CPT | Performed by: PHYSICAL THERAPIST

## 2021-04-27 PROCEDURE — 97530 THERAPEUTIC ACTIVITIES: CPT | Performed by: PHYSICAL THERAPIST

## 2021-05-04 ENCOUNTER — TREATMENT (OUTPATIENT)
Dept: PHYSICAL THERAPY | Facility: CLINIC | Age: 61
End: 2021-05-04

## 2021-05-04 DIAGNOSIS — R26.2 WALKING DIFFICULTY DUE TO KNEE JOINT DISORDER: ICD-10-CM

## 2021-05-04 DIAGNOSIS — S76.112D RUPTURE OF QUADRICEPS TENDON, LEFT, SUBSEQUENT ENCOUNTER: Primary | ICD-10-CM

## 2021-05-04 DIAGNOSIS — M25.9 WALKING DIFFICULTY DUE TO KNEE JOINT DISORDER: ICD-10-CM

## 2021-05-04 DIAGNOSIS — M25.562 ACUTE PAIN OF LEFT KNEE: ICD-10-CM

## 2021-05-04 PROCEDURE — 97530 THERAPEUTIC ACTIVITIES: CPT | Performed by: PHYSICAL THERAPIST

## 2021-05-04 PROCEDURE — 97110 THERAPEUTIC EXERCISES: CPT | Performed by: PHYSICAL THERAPIST

## 2021-05-07 ENCOUNTER — TREATMENT (OUTPATIENT)
Dept: PHYSICAL THERAPY | Facility: CLINIC | Age: 61
End: 2021-05-07

## 2021-05-07 DIAGNOSIS — M25.562 ACUTE PAIN OF LEFT KNEE: ICD-10-CM

## 2021-05-07 DIAGNOSIS — R26.2 WALKING DIFFICULTY DUE TO KNEE JOINT DISORDER: ICD-10-CM

## 2021-05-07 DIAGNOSIS — M25.9 WALKING DIFFICULTY DUE TO KNEE JOINT DISORDER: ICD-10-CM

## 2021-05-07 DIAGNOSIS — S76.112D RUPTURE OF QUADRICEPS TENDON, LEFT, SUBSEQUENT ENCOUNTER: Primary | ICD-10-CM

## 2021-05-07 PROCEDURE — 97530 THERAPEUTIC ACTIVITIES: CPT | Performed by: PHYSICAL THERAPIST

## 2021-05-07 PROCEDURE — 97110 THERAPEUTIC EXERCISES: CPT | Performed by: PHYSICAL THERAPIST

## 2021-05-10 ENCOUNTER — TREATMENT (OUTPATIENT)
Dept: PHYSICAL THERAPY | Facility: CLINIC | Age: 61
End: 2021-05-10

## 2021-05-10 DIAGNOSIS — M25.562 ACUTE PAIN OF LEFT KNEE: ICD-10-CM

## 2021-05-10 DIAGNOSIS — M25.9 WALKING DIFFICULTY DUE TO KNEE JOINT DISORDER: ICD-10-CM

## 2021-05-10 DIAGNOSIS — R26.2 WALKING DIFFICULTY DUE TO KNEE JOINT DISORDER: ICD-10-CM

## 2021-05-10 DIAGNOSIS — S76.112D RUPTURE OF QUADRICEPS TENDON, LEFT, SUBSEQUENT ENCOUNTER: Primary | ICD-10-CM

## 2021-05-10 PROCEDURE — 97110 THERAPEUTIC EXERCISES: CPT | Performed by: PHYSICAL THERAPIST

## 2021-05-10 PROCEDURE — 97112 NEUROMUSCULAR REEDUCATION: CPT | Performed by: PHYSICAL THERAPIST

## 2021-05-10 PROCEDURE — 97530 THERAPEUTIC ACTIVITIES: CPT | Performed by: PHYSICAL THERAPIST

## 2021-05-10 NOTE — PROGRESS NOTES
Physical Therapy Daily Progress Note  Visit # 23           Patient: Abrahan Quinn   : 1960  Diagnosis/ICD-10 Code:  Rupture of quadriceps tendon, left, subsequent encounter [S76.112D]  Referring practitioner: Self Referring  Date of Initial Evaluation:  Type: THERAPY  Noted: 2021      Subjective  Abrahan Quinn reports:   I was pretty sore after some of the testing we did last time.  Denies any soreness at onset of today's session.        Objective   See Exercise, Manual, and Modality Logs for complete treatment.       Assessment & Plan     Assessment  Assessment details: Tolerated continued progression of therapeutic exercise/therapeutic activity well today, no increased pain reported during or after exercises.  Making steady progress toward remaining goals.    Would continue to benefit from skilled PT progressing with functional WB, strength and stability of knee progressing per MD protocol.      Goals  Plan Goals: LONG TERM GOALS: Time for Goal Achievement: 30 visits  1.  Pt to score < 30% perceived disability on LEFS - PROGRESSING  2.  Patient able to ascend/descend steps and prolonged standing & cooking with pain < 2/10 - PROGRESSING  3.  Pt to exhibit full knee AROM to allow for kneeling, bending squatting as is necessary for ADL's, IADL's and household activities.   4.  Pt to exhibit LE endurance/strength to 4+/5 to allow for walking, steps and transfers to occur safely for > 30 mins.- MET  5.  Pt to demonstrate increased stability of the knee to balance on foam as needed to traverse uneven terrain to allow for walking in neighborhood. - MET                           Progress per Plan of Care and Progress strengthening /stabilization /functional activity           Timed:         Manual Therapy:         mins  34099     Therapeutic Exercise:     30    mins  52239     Neuromuscular Luis Felipe:    10    mins  47929    Therapeutic Activity:      15    mins  80734     Gait Training:           mins   93458     Ultrasound:          mins  72352    Ionto                                   mins  64857  Self Care                            mins  61887    Un-Timed:  Electrical Stimulation:         mins 58729 ( )  Traction          mins 43114    Timed Treatment:   55   mins   Total Treatment:     70   mins    DANITA Garrett License #[id number]  Physical Therapist Assistant

## 2021-05-13 ENCOUNTER — TREATMENT (OUTPATIENT)
Dept: PHYSICAL THERAPY | Facility: CLINIC | Age: 61
End: 2021-05-13

## 2021-05-13 DIAGNOSIS — M25.9 WALKING DIFFICULTY DUE TO KNEE JOINT DISORDER: ICD-10-CM

## 2021-05-13 DIAGNOSIS — S76.112D RUPTURE OF QUADRICEPS TENDON, LEFT, SUBSEQUENT ENCOUNTER: Primary | ICD-10-CM

## 2021-05-13 DIAGNOSIS — M25.562 ACUTE PAIN OF LEFT KNEE: ICD-10-CM

## 2021-05-13 DIAGNOSIS — R26.2 WALKING DIFFICULTY DUE TO KNEE JOINT DISORDER: ICD-10-CM

## 2021-05-13 PROCEDURE — 97112 NEUROMUSCULAR REEDUCATION: CPT | Performed by: PHYSICAL THERAPIST

## 2021-05-13 PROCEDURE — 97530 THERAPEUTIC ACTIVITIES: CPT | Performed by: PHYSICAL THERAPIST

## 2021-05-13 PROCEDURE — 97110 THERAPEUTIC EXERCISES: CPT | Performed by: PHYSICAL THERAPIST

## 2021-05-16 NOTE — PROGRESS NOTES
Physical Therapy Daily Progress Note  Visit: 24    Abrahan Quinn reports: My (L) knee is doing better.  I am feeling stronger on my (L) LE but not back to normal.      Subjective     Objective   See Exercise, Manual, and Modality Logs for complete treatment.       Assessment & Plan     Assessment  Assessment details: The pt was extremely fatigued on the arc- for 5 mins in the (L) knee.  It is a good exercise for the pt but in small doses to help prevent injury.  The pt needs more dynamic strengthening in a controlled manner.    Plan  Plan details: Progress ROM / strengthening / stabilization / functional activity as tolerated          Manual Therapy:          mins  13481;  Therapeutic Exercise:     40     mins  12488;     Neuromuscular Luis Felipe:     10    mins  06146;    Therapeutic Activity:      10     mins  63493;     Gait Training:            mins  63768;     Ultrasound:           mins  28055;    Electrical Stimulation:          mins  59832 ( );  Dry Needling           mins self-pay  Traction           mins 14438  Canalith Repositioning         mins 08019      Timed Treatment:   60   mins   Total Treatment:     60   mins    Ruben Porter PT  [place] License #: [id number]    Physical Therapist

## 2021-05-18 ENCOUNTER — TREATMENT (OUTPATIENT)
Dept: PHYSICAL THERAPY | Facility: CLINIC | Age: 61
End: 2021-05-18

## 2021-05-18 DIAGNOSIS — R26.2 WALKING DIFFICULTY DUE TO KNEE JOINT DISORDER: ICD-10-CM

## 2021-05-18 DIAGNOSIS — M25.562 ACUTE PAIN OF LEFT KNEE: ICD-10-CM

## 2021-05-18 DIAGNOSIS — M25.9 WALKING DIFFICULTY DUE TO KNEE JOINT DISORDER: ICD-10-CM

## 2021-05-18 DIAGNOSIS — S76.112D RUPTURE OF QUADRICEPS TENDON, LEFT, SUBSEQUENT ENCOUNTER: Primary | ICD-10-CM

## 2021-05-18 PROCEDURE — 97530 THERAPEUTIC ACTIVITIES: CPT | Performed by: PHYSICAL THERAPIST

## 2021-05-18 PROCEDURE — 97110 THERAPEUTIC EXERCISES: CPT | Performed by: PHYSICAL THERAPIST

## 2021-05-18 NOTE — PROGRESS NOTES
Physical Therapy Daily Progress Note  Visit: 25    Abrahan Cheyenne reports: I am doing well with the (L) knee.  I was really fatigued after going golfing last week.  I was sore after a couple days.  I was careful and limited my self.  I am going to [place] to watch the [employer] practice on Thursday.        Subjective     Objective   See Exercise, Manual, and Modality Logs for complete treatment.       Assessment & Plan     Assessment  Assessment details: The pt was fatigued with today's Tx but maddie today better than previous visit.  Continue to need to work on dynamic strength and endurance of the (L) LE.      Plan  Plan details: Progress ROM / strengthening / stabilization / functional activity as tolerated          Manual Therapy:          mins  05479;  Therapeutic Exercise:     40     mins  09097;     Neuromuscular Luis Felipe:     10    mins  54881;    Therapeutic Activity:      10     mins  88686;     Gait Training:            mins  65702;     Ultrasound:           mins  63376;    Electrical Stimulation:          mins  49786 ( );  Dry Needling           mins self-pay  Traction           mins 05710  Canalith Repositioning         mins 14956      Timed Treatment:   60   mins   Total Treatment:     60   mins    Ruben Porter PT  [place] License #: [id number]    Physical Therapist

## 2021-05-21 ENCOUNTER — TREATMENT (OUTPATIENT)
Dept: PHYSICAL THERAPY | Facility: CLINIC | Age: 61
End: 2021-05-21

## 2021-05-21 DIAGNOSIS — S76.112D RUPTURE OF QUADRICEPS TENDON, LEFT, SUBSEQUENT ENCOUNTER: Primary | ICD-10-CM

## 2021-05-21 DIAGNOSIS — R26.2 WALKING DIFFICULTY DUE TO KNEE JOINT DISORDER: ICD-10-CM

## 2021-05-21 DIAGNOSIS — M25.562 ACUTE PAIN OF LEFT KNEE: ICD-10-CM

## 2021-05-21 DIAGNOSIS — M25.9 WALKING DIFFICULTY DUE TO KNEE JOINT DISORDER: ICD-10-CM

## 2021-05-21 PROCEDURE — 97110 THERAPEUTIC EXERCISES: CPT | Performed by: PHYSICAL THERAPIST

## 2021-05-21 PROCEDURE — 97530 THERAPEUTIC ACTIVITIES: CPT | Performed by: PHYSICAL THERAPIST

## 2021-05-21 PROCEDURE — 97112 NEUROMUSCULAR REEDUCATION: CPT | Performed by: PHYSICAL THERAPIST

## 2021-06-01 ENCOUNTER — TREATMENT (OUTPATIENT)
Dept: PHYSICAL THERAPY | Facility: CLINIC | Age: 61
End: 2021-06-01

## 2021-06-01 DIAGNOSIS — S76.112D RUPTURE OF QUADRICEPS TENDON, LEFT, SUBSEQUENT ENCOUNTER: Primary | ICD-10-CM

## 2021-06-01 DIAGNOSIS — R26.2 WALKING DIFFICULTY DUE TO KNEE JOINT DISORDER: ICD-10-CM

## 2021-06-01 DIAGNOSIS — M25.9 WALKING DIFFICULTY DUE TO KNEE JOINT DISORDER: ICD-10-CM

## 2021-06-01 DIAGNOSIS — M25.562 ACUTE PAIN OF LEFT KNEE: ICD-10-CM

## 2021-06-01 PROCEDURE — 97112 NEUROMUSCULAR REEDUCATION: CPT | Performed by: PHYSICAL THERAPIST

## 2021-06-01 PROCEDURE — 97110 THERAPEUTIC EXERCISES: CPT | Performed by: PHYSICAL THERAPIST

## 2021-06-01 PROCEDURE — 97530 THERAPEUTIC ACTIVITIES: CPT | Performed by: PHYSICAL THERAPIST

## 2021-06-01 NOTE — PROGRESS NOTES
Physical Therapy Daily Progress Note  Visit: 27    Abrahan Cheyenne reports: My (L) knee is doing better but I played golf over the wkend and only chipped and putted.  I am a little sore today.      Subjective     Objective   See Exercise, Manual, and Modality Logs for complete treatment.       Assessment & Plan     Assessment  Assessment details: The pt demos improving endurance, strength and balance with the (L) LE.  Continue to progress as maddie.      Plan  Plan details: Progress ROM / strengthening / stabilization / functional activity as tolerated          Manual Therapy:          mins  56895;  Therapeutic Exercise:     40     mins  16429;     Neuromuscular Luis Felipe:     10    mins  13995;    Therapeutic Activity:      10     mins  10101;     Gait Training:            mins  36473;     Ultrasound:           mins  78268;    Electrical Stimulation:          mins  77008 ( );  Dry Needling           mins self-pay  Traction           mins 99452  Canalith Repositioning         mins 20197      Timed Treatment:   60   mins   Total Treatment:     60   mins    Ruben Porter PT  [place] License #: [id number]    Physical Therapist

## 2021-06-03 ENCOUNTER — TREATMENT (OUTPATIENT)
Dept: PHYSICAL THERAPY | Facility: CLINIC | Age: 61
End: 2021-06-03

## 2021-06-03 DIAGNOSIS — M25.9 WALKING DIFFICULTY DUE TO KNEE JOINT DISORDER: ICD-10-CM

## 2021-06-03 DIAGNOSIS — M25.562 ACUTE PAIN OF LEFT KNEE: ICD-10-CM

## 2021-06-03 DIAGNOSIS — S76.112D RUPTURE OF QUADRICEPS TENDON, LEFT, SUBSEQUENT ENCOUNTER: Primary | ICD-10-CM

## 2021-06-03 DIAGNOSIS — R26.2 WALKING DIFFICULTY DUE TO KNEE JOINT DISORDER: ICD-10-CM

## 2021-06-03 PROCEDURE — 97110 THERAPEUTIC EXERCISES: CPT | Performed by: PHYSICAL THERAPIST

## 2021-06-03 PROCEDURE — 97530 THERAPEUTIC ACTIVITIES: CPT | Performed by: PHYSICAL THERAPIST

## 2021-06-03 PROCEDURE — 97112 NEUROMUSCULAR REEDUCATION: CPT | Performed by: PHYSICAL THERAPIST

## 2021-06-03 NOTE — PROGRESS NOTES
"    Physical Therapy Daily Progress Note  Visit # 28           Patient: Abrahan Quinn   : 1960  Diagnosis/ICD-10 Code:  Rupture of quadriceps tendon, left, subsequent encounter [S76.112D]  Referring practitioner: Self Referring  Date of Initial Evaluation:  Type: THERAPY  Noted: 2021      Subjective  Abrahan Quinn reports:   No new c/o today, doing well overall with \"little aches and pains\" at times.  Does note feeling of \"catch\" with attempted knee straightening after sitting for extended time.      Objective   See Exercise, Manual, and Modality Logs for complete treatment.       Assessment/Plan  Assessment details:  Continues to demo improving endurance, strength and balance with program progressions.  Discussed resuming patellar mobilizations in HEP to address potential patellar restrictions which could be causing the catching feeling.         Progress per Plan of Care and Progress strengthening /stabilization /functional activity           Timed:         Manual Therapy:         mins  78129     Therapeutic Exercise:     40    mins  34857     Neuromuscular Luis Felipe:    10    mins  82223    Therapeutic Activity:      10    mins  31371     Gait Training:           mins  55615     Ultrasound:          mins  23857    Ionto                                   mins  08383  Self Care                            mins  88304    Un-Timed:  Electrical Stimulation:         mins 51271 ( )  Traction          mins 17811    Timed Treatment:   60   mins   Total Treatment:     74   mins    DANITA Garrett License #[id number]  Physical Therapist Assistant       "

## 2021-06-08 ENCOUNTER — TREATMENT (OUTPATIENT)
Dept: PHYSICAL THERAPY | Facility: CLINIC | Age: 61
End: 2021-06-08

## 2021-06-08 DIAGNOSIS — R26.2 WALKING DIFFICULTY DUE TO KNEE JOINT DISORDER: ICD-10-CM

## 2021-06-08 DIAGNOSIS — S76.112D RUPTURE OF QUADRICEPS TENDON, LEFT, SUBSEQUENT ENCOUNTER: Primary | ICD-10-CM

## 2021-06-08 DIAGNOSIS — M25.562 ACUTE PAIN OF LEFT KNEE: ICD-10-CM

## 2021-06-08 DIAGNOSIS — M25.9 WALKING DIFFICULTY DUE TO KNEE JOINT DISORDER: ICD-10-CM

## 2021-06-08 PROCEDURE — 97530 THERAPEUTIC ACTIVITIES: CPT | Performed by: PHYSICAL THERAPIST

## 2021-06-08 PROCEDURE — 97112 NEUROMUSCULAR REEDUCATION: CPT | Performed by: PHYSICAL THERAPIST

## 2021-06-08 PROCEDURE — 97110 THERAPEUTIC EXERCISES: CPT | Performed by: PHYSICAL THERAPIST

## 2021-06-08 NOTE — PROGRESS NOTES
"    Physical Therapy Daily Progress Note  Visit # 29           Patient: Abrahan Quinn   : 1960  Diagnosis/ICD-10 Code:  Rupture of quadriceps tendon, left, subsequent encounter [S76.112D]  Referring practitioner: Self Referring  Date of Initial Evaluation:  Type: THERAPY  Noted: 2021      Subjective  Abrahan Quinn reports:   No new c/o today, doing well overall with \"little aches and pains\" at times.  Does note feeling of \"catch\" with attempted knee straightening after sitting for extended time.      Objective   See Exercise, Manual, and Modality Logs for complete treatment.       Assessment/Plan  Assessment details:  Continues to demo improving endurance, strength and balance with program progressions.  Discussed resuming patellar mobilizations in HEP to address potential patellar restrictions which could be causing the catching feeling.         Progress per Plan of Care and Progress strengthening /stabilization /functional activity           Timed:         Manual Therapy:         mins  65296     Therapeutic Exercise:     35    mins  44576     Neuromuscular Luis Felipe:    10    mins  43354    Therapeutic Activity:      15    mins  66934     Gait Training:           mins  09845     Ultrasound:          mins  36293    Ionto                                   mins  81767  Self Care                            mins  58059    Un-Timed:  Electrical Stimulation:         mins 82211 ( )  Traction          mins 31044    Timed Treatment:   60   mins   Total Treatment:     60   mins    DANITA Garrett License #[id number]  Physical Therapist Assistant       "

## 2021-06-10 ENCOUNTER — TREATMENT (OUTPATIENT)
Dept: PHYSICAL THERAPY | Facility: CLINIC | Age: 61
End: 2021-06-10

## 2021-06-10 DIAGNOSIS — R26.2 WALKING DIFFICULTY DUE TO KNEE JOINT DISORDER: ICD-10-CM

## 2021-06-10 DIAGNOSIS — M25.9 WALKING DIFFICULTY DUE TO KNEE JOINT DISORDER: ICD-10-CM

## 2021-06-10 DIAGNOSIS — M25.562 ACUTE PAIN OF LEFT KNEE: ICD-10-CM

## 2021-06-10 DIAGNOSIS — S76.112D RUPTURE OF QUADRICEPS TENDON, LEFT, SUBSEQUENT ENCOUNTER: Primary | ICD-10-CM

## 2021-06-10 PROCEDURE — 97110 THERAPEUTIC EXERCISES: CPT | Performed by: PHYSICAL THERAPIST

## 2021-06-10 NOTE — PROGRESS NOTES
Physical Therapy Daily Progress Note/30 day re-assess    Patient: Abrahan Quinn   : 1960  Diagnosis/ICD-10 Code:  Rupture of quadriceps tendon, left, subsequent encounter [S76.112D]  Referring practitioner: Self Referring  Date of Initial Visit: Type: THERAPY  Noted: 2021  Today's Date: 6/10/2021  Patient seen for 30 sessions           Subjective Evaluation    History of Present Illness    Subjective comment: swinging irons softly. ortho in July to get released for golf at least. not ready to go down ramps just yet. 2/10 pain from some post PT soreness.        Objective   See Exercise, Manual, and Modality Logs for complete treatment.       Assessment & Plan     Assessment  Assessment details: Goals  Plan Goals: SHORT TERM GOALS: Time for Goal Achievement: 15 visits    1.  Patient to be compliant with HEP. - MET  2.  Pt able to ascend/descend steps and transfer with less knee pain < 5/10. - MET  3.  Increased hip joint mobility to allow for decreased stress at tibiofemoral joint. - MET  4.  Pt. to exhibit increased LE endurance/strength to 3+/5 to allow for increased ease with sit-stand and standing/walking > 30 minutes, such as walking in grocery stores w/ or w/o assistive device. - MET    LONG TERM GOALS: Time for Goal Achievement: 30 visits  1.  Pt to score < 30% perceived disability on LEFS - PROGRESSING. 33% 53/80 today.   2.  Patient able to ascend/descend steps and prolonged standing & cooking with pain < 2/10 - PROGRESSING. Still can't easily descend normally with no rail  3.  Pt to exhibit full knee AROM to allow for kneeling, bending squatting as is necessary for ADL's, IADL's and household activities. Ongoing.120 vs. 135.   4.  Pt to exhibit LE endurance/strength to 4+/5 to allow for walking, steps and transfers to occur safely for > 30 mins.- Ongoing. 75% strength per pt thus 4- (tested 55 vs. 100 leg press so 55% per this test)  5.  Pt to demonstrate increased stability of the knee to  "balance on foam as needed to traverse uneven terrain to allow for walking in neighborhood. - Ongoing as he feels good for terrains except steep ramp to friend's boat.    6. Resolve swelling. New goal. 39 vs. 41 cm at top of patella.              Prognosis details: Pt doing well but needing more time to improve quad strength and knee ROM. Would like to see pt at 75% leg press, normal descent on 6\" step, ability to go down steep ramp, and 125 knee flexion.    P: cont with prn PT and pt feels like he is very close to converting PT to indep gym program.                Timed:    Manual Therapy:         mins  36887;  Therapeutic Exercise:     60    mins  55838;     Neuromuscular Luis Felipe:        mins  14050;    Therapeutic Activity:          mins  83487;     Gait Training:           mins  84592;     Ultrasound:          mins  94616;    Electrical Stimulation:         mins  96723 ( );  Iontophoresis         mins 62884;  Aquatic Therapy         mins 81725;  Dry Needling                   mins    Untimed:  Electrical Stimulation:         mins  07169 ( );  Mechanical Traction:         mins  94828;     Timed Treatment:   60   mins   Total Treatment:     60   mins  Zorre Zeno Kimura, PT  Physical Therapist  "

## 2021-06-15 ENCOUNTER — TREATMENT (OUTPATIENT)
Dept: PHYSICAL THERAPY | Facility: CLINIC | Age: 61
End: 2021-06-15

## 2021-06-15 DIAGNOSIS — S76.112D RUPTURE OF QUADRICEPS TENDON, LEFT, SUBSEQUENT ENCOUNTER: Primary | ICD-10-CM

## 2021-06-15 DIAGNOSIS — M25.562 ACUTE PAIN OF LEFT KNEE: ICD-10-CM

## 2021-06-15 DIAGNOSIS — M25.9 WALKING DIFFICULTY DUE TO KNEE JOINT DISORDER: ICD-10-CM

## 2021-06-15 DIAGNOSIS — R26.2 WALKING DIFFICULTY DUE TO KNEE JOINT DISORDER: ICD-10-CM

## 2021-06-15 PROCEDURE — 97112 NEUROMUSCULAR REEDUCATION: CPT | Performed by: PHYSICAL THERAPIST

## 2021-06-15 PROCEDURE — 97110 THERAPEUTIC EXERCISES: CPT | Performed by: PHYSICAL THERAPIST

## 2021-06-15 PROCEDURE — 97530 THERAPEUTIC ACTIVITIES: CPT | Performed by: PHYSICAL THERAPIST

## 2021-06-15 NOTE — PROGRESS NOTES
"    Physical Therapy Daily Progress Note  Visit # 31           Patient: Abrahan Quinn   : 1960  Diagnosis/ICD-10 Code:  Rupture of quadriceps tendon, left, subsequent encounter [S76.112D]  Referring practitioner: Self Referring  Date of Initial Evaluation:  Type: THERAPY  Noted: 2021      Subjective  Abrahan Quinn reports:   No new c/o today, feeling stronger daily.  Feels like he will be able to transition to independent exercise program by the end of the month.        Objective   See Exercise, Manual, and Modality Logs for complete treatment.   Progressed with deeper TRX-style squats using fully weighted cables, progressed leg press to 120# (135# is approx 3/4 of pt's body weight), continued step down exercises focusing on control, performed to fatigue on (R) LE on 4\" step.         Assessment/Plan  Assessment details:  Continues to demo improving endurance, strength and balance with program progressions.  Reviewed patellar mobilizations in HEP to address potential patellar restrictions after prolonged sitting.        Progress per Plan of Care and Progress strengthening /stabilization /functional activity           Timed:         Manual Therapy:         mins  70367     Therapeutic Exercise:     35    mins  51926     Neuromuscular Luis Felipe:    10    mins  89488    Therapeutic Activity:      15    mins  08021     Gait Training:           mins  63169     Ultrasound:          mins  30484    Ionto                                   mins  28163  Self Care                            mins  82706    Un-Timed:  Electrical Stimulation:         mins 78878 ( )  Traction          mins 91623    Timed Treatment:   60   mins   Total Treatment:     60   mins    DANITA Garrett License #[id number]  Physical Therapist Assistant       "

## 2021-06-17 ENCOUNTER — TREATMENT (OUTPATIENT)
Dept: PHYSICAL THERAPY | Facility: CLINIC | Age: 61
End: 2021-06-17

## 2021-06-17 DIAGNOSIS — R26.2 WALKING DIFFICULTY DUE TO KNEE JOINT DISORDER: ICD-10-CM

## 2021-06-17 DIAGNOSIS — M25.9 WALKING DIFFICULTY DUE TO KNEE JOINT DISORDER: ICD-10-CM

## 2021-06-17 DIAGNOSIS — S76.112D RUPTURE OF QUADRICEPS TENDON, LEFT, SUBSEQUENT ENCOUNTER: Primary | ICD-10-CM

## 2021-06-17 DIAGNOSIS — M25.562 ACUTE PAIN OF LEFT KNEE: ICD-10-CM

## 2021-06-17 PROCEDURE — 97112 NEUROMUSCULAR REEDUCATION: CPT | Performed by: PHYSICAL THERAPIST

## 2021-06-17 PROCEDURE — 97530 THERAPEUTIC ACTIVITIES: CPT | Performed by: PHYSICAL THERAPIST

## 2021-06-17 PROCEDURE — 97110 THERAPEUTIC EXERCISES: CPT | Performed by: PHYSICAL THERAPIST

## 2021-06-17 NOTE — PROGRESS NOTES
Physical Therapy Daily Progress Note  Visit: 32    Abrahan Quinn reports: I am feeling good in my (L) knee.  I feel stronger and more capable each day.      Subjective     Objective   See Exercise, Manual, and Modality Logs for complete treatment.       Assessment & Plan     Assessment  Assessment details: The pt continues to improve with the (L) knee with increased strength and maddie for exercise and activity of the (L) LE.      Plan  Plan details: Progress ROM / strengthening / stabilization / functional activity as tolerated          Manual Therapy:          mins  43968;  Therapeutic Exercise:     40     mins  94736;     Neuromuscular Luis Felipe:     10    mins  32099;    Therapeutic Activity:      10     mins  77199;     Gait Training:            mins  73699;     Ultrasound:           mins  42858;    Electrical Stimulation:          mins  56833 ( );  Dry Needling           mins self-pay  Traction           mins 63420  Canalith Repositioning         mins 84712      Timed Treatment:   60   mins   Total Treatment:     60   mins    Ruben Porter PT  [place] License #: [id number]    Physical Therapist

## 2021-06-22 ENCOUNTER — TREATMENT (OUTPATIENT)
Dept: PHYSICAL THERAPY | Facility: CLINIC | Age: 61
End: 2021-06-22

## 2021-06-22 DIAGNOSIS — M25.562 ACUTE PAIN OF LEFT KNEE: ICD-10-CM

## 2021-06-22 DIAGNOSIS — R26.2 WALKING DIFFICULTY DUE TO KNEE JOINT DISORDER: ICD-10-CM

## 2021-06-22 DIAGNOSIS — S76.112D RUPTURE OF QUADRICEPS TENDON, LEFT, SUBSEQUENT ENCOUNTER: Primary | ICD-10-CM

## 2021-06-22 DIAGNOSIS — M25.9 WALKING DIFFICULTY DUE TO KNEE JOINT DISORDER: ICD-10-CM

## 2021-06-22 PROCEDURE — 97112 NEUROMUSCULAR REEDUCATION: CPT | Performed by: PHYSICAL THERAPIST

## 2021-06-22 PROCEDURE — 97530 THERAPEUTIC ACTIVITIES: CPT | Performed by: PHYSICAL THERAPIST

## 2021-06-22 PROCEDURE — 97110 THERAPEUTIC EXERCISES: CPT | Performed by: PHYSICAL THERAPIST

## 2021-06-22 NOTE — PROGRESS NOTES
Physical Therapy Daily Progress Note  Visit: 33     Abrahan Quinn reports: I am doing well with my (L) knee.  I was a little sore after my last visit but nothing bad.  I am riding the stationary bike at home daily for 12 mins.      Subjective     Objective   See Exercise, Manual, and Modality Logs for complete treatment.       Assessment & Plan     Assessment  Assessment details: The pt was fatigued with the exercises performed in the clinic.  Worked on dynamic endurance for (B) LE.  Discussed hiking on trails with elevation and short.      Plan  Plan details: Progress ROM / strengthening / stabilization / functional activity as tolerated    Re-assess for possible DC upon next visit.          Manual Therapy:          mins  73979;  Therapeutic Exercise:     30     mins  58684;     Neuromuscular Luis Felipe:     15    mins  72713;    Therapeutic Activity:           mins  86305;     Gait Training:       15     mins  07109;     Ultrasound:           mins  40314;    Electrical Stimulation:          mins  31691 ( );  Dry Needling           mins self-pay  Traction           mins 27501  Canalith Repositioning         mins 60608      Timed Treatment:   60   mins   Total Treatment:     60   mins    Ruben Porter PT  [place] License #: [id number]    Physical Therapist

## 2021-06-29 ENCOUNTER — TREATMENT (OUTPATIENT)
Dept: PHYSICAL THERAPY | Facility: CLINIC | Age: 61
End: 2021-06-29

## 2021-06-29 DIAGNOSIS — R26.2 WALKING DIFFICULTY DUE TO KNEE JOINT DISORDER: ICD-10-CM

## 2021-06-29 DIAGNOSIS — M25.9 WALKING DIFFICULTY DUE TO KNEE JOINT DISORDER: ICD-10-CM

## 2021-06-29 DIAGNOSIS — M25.562 ACUTE PAIN OF LEFT KNEE: ICD-10-CM

## 2021-06-29 DIAGNOSIS — S76.112D RUPTURE OF QUADRICEPS TENDON, LEFT, SUBSEQUENT ENCOUNTER: Primary | ICD-10-CM

## 2021-06-29 PROCEDURE — 97112 NEUROMUSCULAR REEDUCATION: CPT | Performed by: PHYSICAL THERAPIST

## 2021-06-29 PROCEDURE — 97530 THERAPEUTIC ACTIVITIES: CPT | Performed by: PHYSICAL THERAPIST

## 2021-06-29 PROCEDURE — 97110 THERAPEUTIC EXERCISES: CPT | Performed by: PHYSICAL THERAPIST
